# Patient Record
Sex: FEMALE | Race: WHITE | Employment: FULL TIME | URBAN - METROPOLITAN AREA
[De-identification: names, ages, dates, MRNs, and addresses within clinical notes are randomized per-mention and may not be internally consistent; named-entity substitution may affect disease eponyms.]

---

## 2022-05-05 ENCOUNTER — ANESTHESIA EVENT (OUTPATIENT)
Dept: ENDOSCOPY | Age: 37
End: 2022-05-05
Payer: OTHER GOVERNMENT

## 2022-05-05 RX ORDER — OXYCODONE AND ACETAMINOPHEN 7.5; 325 MG/1; MG/1
1 TABLET ORAL AS NEEDED
Status: CANCELLED | OUTPATIENT
Start: 2022-05-05

## 2022-05-05 RX ORDER — ALBUTEROL SULFATE 0.83 MG/ML
2.5 SOLUTION RESPIRATORY (INHALATION) AS NEEDED
Status: CANCELLED | OUTPATIENT
Start: 2022-05-05

## 2022-05-05 RX ORDER — FLUMAZENIL 0.1 MG/ML
0.2 INJECTION INTRAVENOUS
Status: CANCELLED | OUTPATIENT
Start: 2022-05-05

## 2022-05-05 RX ORDER — ONDANSETRON 2 MG/ML
4 INJECTION INTRAMUSCULAR; INTRAVENOUS ONCE
Status: CANCELLED | OUTPATIENT
Start: 2022-05-05 | End: 2022-05-05

## 2022-05-05 RX ORDER — DIPHENHYDRAMINE HYDROCHLORIDE 50 MG/ML
12.5 INJECTION, SOLUTION INTRAMUSCULAR; INTRAVENOUS
Status: CANCELLED | OUTPATIENT
Start: 2022-05-05

## 2022-05-05 RX ORDER — OXYCODONE AND ACETAMINOPHEN 10; 325 MG/1; MG/1
1 TABLET ORAL AS NEEDED
Status: CANCELLED | OUTPATIENT
Start: 2022-05-05

## 2022-05-05 RX ORDER — OXYCODONE AND ACETAMINOPHEN 5; 325 MG/1; MG/1
1 TABLET ORAL AS NEEDED
Status: CANCELLED | OUTPATIENT
Start: 2022-05-05

## 2022-05-05 RX ORDER — NALOXONE HYDROCHLORIDE 0.4 MG/ML
0.2 INJECTION, SOLUTION INTRAMUSCULAR; INTRAVENOUS; SUBCUTANEOUS AS NEEDED
Status: CANCELLED | OUTPATIENT
Start: 2022-05-05

## 2022-05-05 RX ORDER — SODIUM CHLORIDE, SODIUM LACTATE, POTASSIUM CHLORIDE, CALCIUM CHLORIDE 600; 310; 30; 20 MG/100ML; MG/100ML; MG/100ML; MG/100ML
1000 INJECTION, SOLUTION INTRAVENOUS CONTINUOUS
Status: CANCELLED | OUTPATIENT
Start: 2022-05-05

## 2022-05-06 ENCOUNTER — ANESTHESIA (OUTPATIENT)
Dept: ENDOSCOPY | Age: 37
End: 2022-05-06
Payer: OTHER GOVERNMENT

## 2022-05-06 ENCOUNTER — HOSPITAL ENCOUNTER (OUTPATIENT)
Age: 37
Setting detail: OUTPATIENT SURGERY
Discharge: HOME | End: 2022-05-06
Attending: INTERNAL MEDICINE | Admitting: INTERNAL MEDICINE
Payer: OTHER GOVERNMENT

## 2022-05-06 VITALS
RESPIRATION RATE: 16 BRPM | SYSTOLIC BLOOD PRESSURE: 107 MMHG | HEIGHT: 67 IN | BODY MASS INDEX: 27.99 KG/M2 | WEIGHT: 178.3 LBS | OXYGEN SATURATION: 100 % | TEMPERATURE: 97.6 F | DIASTOLIC BLOOD PRESSURE: 59 MMHG | HEART RATE: 64 BPM

## 2022-05-06 LAB — HCG SERPL QL: NEGATIVE

## 2022-05-06 PROCEDURE — 77030021593 HC FCPS BIOP ENDOSC BSC -A: Performed by: INTERNAL MEDICINE

## 2022-05-06 PROCEDURE — 74011000250 HC RX REV CODE- 250: Performed by: NURSE ANESTHETIST, CERTIFIED REGISTERED

## 2022-05-06 PROCEDURE — 2709999900 HC NON-CHARGEABLE SUPPLY: Performed by: INTERNAL MEDICINE

## 2022-05-06 PROCEDURE — 76060000032 HC ANESTHESIA 0.5 TO 1 HR: Performed by: INTERNAL MEDICINE

## 2022-05-06 PROCEDURE — 74011250636 HC RX REV CODE- 250/636: Performed by: INTERNAL MEDICINE

## 2022-05-06 PROCEDURE — 84703 CHORIONIC GONADOTROPIN ASSAY: CPT

## 2022-05-06 PROCEDURE — 76040000007: Performed by: INTERNAL MEDICINE

## 2022-05-06 PROCEDURE — 74011250636 HC RX REV CODE- 250/636: Performed by: NURSE ANESTHETIST, CERTIFIED REGISTERED

## 2022-05-06 RX ORDER — SODIUM CHLORIDE 0.9 % (FLUSH) 0.9 %
5-40 SYRINGE (ML) INJECTION AS NEEDED
Status: DISCONTINUED | OUTPATIENT
Start: 2022-05-06 | End: 2022-05-06 | Stop reason: HOSPADM

## 2022-05-06 RX ORDER — DEXTROMETHORPHAN/PSEUDOEPHED 2.5-7.5/.8
1.2 DROPS ORAL
Status: CANCELLED | OUTPATIENT
Start: 2022-05-06

## 2022-05-06 RX ORDER — FENTANYL CITRATE 50 UG/ML
INJECTION, SOLUTION INTRAMUSCULAR; INTRAVENOUS AS NEEDED
Status: DISCONTINUED | OUTPATIENT
Start: 2022-05-06 | End: 2022-05-06 | Stop reason: HOSPADM

## 2022-05-06 RX ORDER — SODIUM CHLORIDE 0.9 % (FLUSH) 0.9 %
5-40 SYRINGE (ML) INJECTION EVERY 8 HOURS
Status: DISCONTINUED | OUTPATIENT
Start: 2022-05-06 | End: 2022-05-06 | Stop reason: HOSPADM

## 2022-05-06 RX ORDER — ATROPINE SULFATE 0.1 MG/ML
0.5 INJECTION INTRAVENOUS
Status: CANCELLED | OUTPATIENT
Start: 2022-05-06 | End: 2022-05-07

## 2022-05-06 RX ORDER — PROPOFOL 10 MG/ML
INJECTION, EMULSION INTRAVENOUS AS NEEDED
Status: DISCONTINUED | OUTPATIENT
Start: 2022-05-06 | End: 2022-05-06 | Stop reason: HOSPADM

## 2022-05-06 RX ORDER — LIDOCAINE HYDROCHLORIDE 20 MG/ML
INJECTION, SOLUTION EPIDURAL; INFILTRATION; INTRACAUDAL; PERINEURAL AS NEEDED
Status: DISCONTINUED | OUTPATIENT
Start: 2022-05-06 | End: 2022-05-06 | Stop reason: HOSPADM

## 2022-05-06 RX ORDER — ONDANSETRON 2 MG/ML
INJECTION INTRAMUSCULAR; INTRAVENOUS AS NEEDED
Status: DISCONTINUED | OUTPATIENT
Start: 2022-05-06 | End: 2022-05-06 | Stop reason: HOSPADM

## 2022-05-06 RX ORDER — ERGOCALCIFEROL 1.25 MG/1
50000 CAPSULE ORAL
COMMUNITY

## 2022-05-06 RX ORDER — SODIUM CHLORIDE 9 MG/ML
125 INJECTION, SOLUTION INTRAVENOUS CONTINUOUS
Status: DISCONTINUED | OUTPATIENT
Start: 2022-05-06 | End: 2022-05-06 | Stop reason: HOSPADM

## 2022-05-06 RX ORDER — NALOXONE HYDROCHLORIDE 0.4 MG/ML
0.4 INJECTION, SOLUTION INTRAMUSCULAR; INTRAVENOUS; SUBCUTANEOUS
Status: CANCELLED | OUTPATIENT
Start: 2022-05-06 | End: 2022-05-06

## 2022-05-06 RX ORDER — EPINEPHRINE 0.1 MG/ML
1 INJECTION INTRACARDIAC; INTRAVENOUS
Status: CANCELLED | OUTPATIENT
Start: 2022-05-06 | End: 2022-05-07

## 2022-05-06 RX ORDER — FLUMAZENIL 0.1 MG/ML
0.2 INJECTION INTRAVENOUS
Status: CANCELLED | OUTPATIENT
Start: 2022-05-06 | End: 2022-05-06

## 2022-05-06 RX ADMIN — FENTANYL CITRATE 25 MCG: 50 INJECTION, SOLUTION INTRAMUSCULAR; INTRAVENOUS at 13:35

## 2022-05-06 RX ADMIN — ONDANSETRON HYDROCHLORIDE 4 MG: 2 INJECTION INTRAMUSCULAR; INTRAVENOUS at 13:34

## 2022-05-06 RX ADMIN — SODIUM CHLORIDE: 900 INJECTION, SOLUTION INTRAVENOUS at 13:32

## 2022-05-06 RX ADMIN — PROPOFOL 20 MG: 10 INJECTION, EMULSION INTRAVENOUS at 13:36

## 2022-05-06 RX ADMIN — PROPOFOL 20 MG: 10 INJECTION, EMULSION INTRAVENOUS at 13:44

## 2022-05-06 RX ADMIN — LIDOCAINE HYDROCHLORIDE 40 MG: 20 INJECTION, SOLUTION INTRAVENOUS at 13:35

## 2022-05-06 RX ADMIN — PROPOFOL 20 MG: 10 INJECTION, EMULSION INTRAVENOUS at 13:41

## 2022-05-06 RX ADMIN — PROPOFOL 30 MG: 10 INJECTION, EMULSION INTRAVENOUS at 13:40

## 2022-05-06 RX ADMIN — PROPOFOL 30 MG: 10 INJECTION, EMULSION INTRAVENOUS at 13:37

## 2022-05-06 RX ADMIN — SODIUM CHLORIDE 125 ML/HR: 900 INJECTION, SOLUTION INTRAVENOUS at 12:10

## 2022-05-06 RX ADMIN — PROPOFOL 20 MG: 10 INJECTION, EMULSION INTRAVENOUS at 13:46

## 2022-05-06 RX ADMIN — PROPOFOL 100 MG: 10 INJECTION, EMULSION INTRAVENOUS at 13:35

## 2022-05-06 NOTE — PROCEDURES
Colonoscopy Procedure Note    Indications: Family history of colon cancer    Anesthesia/Sedation: MAC anesthesia Propofol    Pre-Procedure Exam:  Airway: clear   Heart: normal S1and S2    Lungs: clear bilateral  Abdomen: soft, nontender, bowel sounds present and normal in all quadrants   Mental Status: awake, alert, and oriented to person, place, and time      Procedure in Detail:  Informed consent was obtained for the procedure, including sedation. Risks of perforation, hemorrhage, adverse drug reaction, and aspiration were discussed. The patient was placed in the left lateral decubitus position. Based on the pre-procedure assessment, including review of the patient's medical history, medications, allergies, and review of systems, she had been deemed to be an appropriate candidate for moderate sedation; she was therefore sedated with the medications listed above. The patient was monitored continuously with ECG tracing, pulse oximetry, blood pressure monitoring, and direct observations. A rectal examination was performed. The [id number] was inserted into the rectum and advanced under direct vision to the cecum, which was identified by the ileocecal valve and appendiceal orifice. The quality of the colonic preparation was excellent. A careful inspection was made as the colonoscope was withdrawn, including a retroflexed view of the rectum; findings and interventions are described below. Appropriate photodocumentation was obtained. ANUS: Anal exam reveals no masses or hemorrhoids, sphincter tone is normal.   RECTUM: Rectal exam reveals no masses or hemorrhoids. SIGMOID COLON: The mucosa is normal with good vascular pattern and without ulcers, diverticula, and polyps. DESCENDING COLON: The mucosa is normal with good vascular pattern and without ulcers, diverticula, and polyps.    SPLENIC FLEXURE: The splenic flexure is normal.   TRANSVERSE COLON: The mucosa is normal with good vascular pattern and without ulcers, diverticula, and polyps. HEPATIC FLEXURE: The hepatic flexure is normal.   ASCENDING COLON: The mucosa is normal with good vascular pattern and without ulcers, diverticula, and polyps. CECUM: The appendiceal orifice appears normal. The ileocecal valve appears normal.   TERMINAL ILEUM: The terminal ileum was not entered. Specimens: No specimens were collected. EBL: None    No prosthetic devices, grafts, tissues, transplant or devices implanted. Withdrawal Time: 13 min    Complications: None; patient tolerated the procedure well. Attending Attestation: I performed the procedure. Recommendations:   - Repeat colonoscopy in 5 years.     Signed By: Florentin Perdue MD                      5/6/2022

## 2022-05-06 NOTE — ANESTHESIA POSTPROCEDURE EVALUATION
Post-Anesthesia Evaluation/Assessment    Cardiovascular Function/Vital Signs  Visit Vitals  BP (!) 97/57   Pulse (!) 57   Temp 36.4 °C (97.6 °F)   Resp 15   Ht 5' 7\" (1.702 m)   Wt 80.9 kg (178 lb 4.8 oz)   SpO2 100%   Breastfeeding No   BMI 27.93 kg/m²       Patient is status post Procedure(s):  COLONOSCOPY WITH MAC. Nausea/Vomiting: Controlled. Postoperative hydration reviewed and adequate. Pain:  Pain Scale 1: Numeric (0 - 10) (05/06/22 1407)  Pain Intensity 1: 0 (05/06/22 1407)   Managed. Neurological Status/Mental Status and Level of Consciousness:   Appropriately returning/progressing to baseline     Pulmonary Status:   O2 Device: None (Room air) (05/06/22 1407)   Adequate oxygenation and airway patent. No apparent anesthetic complications.      Signed By: Shireen Bliss MD    May 6, 2022

## 2022-05-06 NOTE — H&P
[address] [address]  Tel: [phone]  Fax: [phone]    Patient:  Nelson Hanks  YOB: 1985  Birth Sex:  Female  Current Gender:  Female  Date:   03/18/2022 2:40 PM   Historian:    self  Visit Type:   Office Visit        Completed Orders (This Visit)  Order  Details  Reason  Side  Interpretation  Result  Initial Treatment Date  Region  Weight monitoring                Dietary management education, guidance, and counseling                  Assessment/Plan  #  Detail Type  Description   1. Assessment  Family history of malignant neoplasm of digestive organs (Z80.0). Patient Plan  This is a pleasant 40 y/o female with a brother diagnosed recently with Stage III colon cancer at the age of 45. Also has an uncle with colon cancer    PLAN  1.  obtain brother's path report and any genetic testing for review  2. COLO to eval.  every 5 year plan     The risks, benefits, adverse reactions were discussed in detail today with the patient. This includes, but is not limited to, the risk of bleeding, infections, perforation, death, missed lesions, reactions to anesthesia/sedation, other. They understand and agree to undergo the procedure. Colonoscopy with MAC, miralax solution. 2.  Assessment  Body mass index (BMI) 27.0-27.9, adult (S82.07). Plan Orders  Today's instructions / counseling include(s) Dietary management education, guidance, and counseling. Weight monitoring              This 39year old  patient was referred by Rose Elias. This 39year old female presents for Colon Cancer Screening. History of Present Illness  1. Colon Cancer Screening   No prior screening. Risk Factors: family history - sibling.   Pertinent negatives include abdominal pain, change in bowel habits, change in stool caliber, constipation, decreased appetite, diarrhea, melena, nausea, rectal bleeding, vomiting, weight gain and weight loss. Additional information: No family history of colon cancer, No family history of Crohn's/colitis and No NSAID/ASA use. Problem List  Problem List reviewed. Medications (active prior to today)  Medication  Instructions  Start Date  Stop Date  Refilled  Elsewhere  multivitamin    //  03/18/2022    Y  Motrin IB    // 03/18/2022    Y  Vitamin D2 1,250 mcg (50,000 unit) capsule  take 1 capsule by oral route  every week  02/25/2022 05/25/2022    N        Medications (Added, Continued or Stopped today)  Start Date  Medication  Directions  PRN Status  PRN Reason  Instruction  Stop Date    Motrin IB    N      03/18/2022    multivitamin    N      03/18/2022 02/25/2022  Vitamin D2 1,250 mcg (50,000 unit) capsule  take 1 capsule by oral route  every week  N      05/25/2022    Allergies  Ingredient  Reaction (Severity)  Medication Name  Comment  CHLORHEXIDINE  Rash              Review of Systems  System  Neg/Pos  Details  Constitutional  Negative  Chills, Fever, Weight gain and Weight loss. ENMT  Negative  Ear infections and Nasal congestion. Respiratory  Negative  Chronic cough, Dyspnea and Wheezing. Cardio  Negative  Chest pain. GI  Negative  Abdominal pain, Change in bowel habits, Change in stool caliber, Constipation, Decreased appetite, Diarrhea, Dysphagia, Heartburn, Hematemesis, Hematochezia, Melena, Nausea, Rectal bleeding, Reflux and Vomiting.   Negative  Dysuria. Endocrine  Negative  Cold intolerance and Heat intolerance. Neuro  Negative  Dizziness and Headache. Psych  Negative  Anxiety, Depression and Increased stress. MS  Negative  Back pain and Joint pain. Hema/Lymph  Negative  Easy bleeding and Easy bruising.       Vital Signs  Height  Time  ft  in  cm  Last Measured  Height Position  2:51 PM  5.0  7.00  170.18  09/24/2019  0    Weight/BSA/BMI  Time  lb  oz  kg  Context  BMI kg/m2  BSA m2  2:51 PM  176.00    79.832    27.57      Blood Pressure  Time  BP mm/Hg  Position  Side  Site  Method  Cuff Size  2:51 PM  122/80              Temperature/Pulse/Respiration  Time  Temp F  Temp C  Temp Site  Pulse/min  Pattern  Resp/ min  2:51 PM        72  regular      Pulse Oximetry/FIO2  Time  Pulse Ox (Rest %)  Pulse Ox (Amb %)  O2 Sat  O2 L/Min  Timing  FiO2 %  L/min  Delivery Method  Finger Probe  2:51 PM  99    RA                Measured by  Time  Measured by  2:51 PM  Ginger Foley    Physical  Exam  Exam  Findings  Details  Constitutional  Normal  No acute distress. Well nourished. Well developed. Eyes  Normal  General - Right: Normal, Left: Normal. Sclera - Right: Normal, Left: Normal. Pupil - Right: Normal, Left: Normal. Iris - Right: Normal, Left: Normal.  Ears  Normal  Inspection - Right: Normal, Left: Normal.  Nose/Mouth/Throat  Normal  External nose - Normal. Lips/teeth/gums - Normal.  Neck Exam  Normal  Inspection - Normal. Thyroid gland - Normal. Range of motion - Normal.  Respiratory  Normal  Inspection - Normal. Auscultation - Normal. Effort - Normal.  Cardiovascular  Normal  Heart rate - Regular rate. Rhythm - Regular. Murmurs - None. Abdomen  Normal  Inspection - Normal. Appliance(s) - None. Auscultation - Normal. Anterior palpation - Normal, No guarding, No rebound. No abdominal tenderness. No hepatic enlargement. No hernia. No ascites. Barron's sign - Normal.  Skin  *  Body areas examined - Head/face, Neck, Abdomen. Skin  Normal  Inspection - Normal. Nails - Normal.  Musculoskeletal  Normal  Visual overview of all four extremities is normal. Gait - Normal.  Extremity  Normal  No Edema. Clubbing - Absent. Neurological  Normal  Level of consciousness - Normal. Memory - Normal. Cranial nerves - Cranial nerves II through XII grossly intact. Sensory - Normal. Balance & gait - Normal. Coordination - Normal.  Psychiatric  Normal  Orientation - Oriented to time, place, person & situation. No agitation. Appropriate mood and affect.  Behavior is appropriate for age.  Immunizations Entered by History  Date  Immunization  9/9/2019 12:00:00 AM  influenza, intradermal, quadrivalent, preservative free, injectable      Active Patient Care Team Members  Name  Contact  Agency Type  Support Role  Relationship  Active Date  Inactive Date  Specialty  Lillian Cates      encounter provider        Gastroenterology  Simeon Reed      Patient provider  PCP      [address] [facility] [place]      Emergency Contact  Spouse            Provider:    Sabrina Ansari MD 03/18/2022 3:16 PM    Document generated by: Sabrina Ansari 03/18/2022    CC Providers    Simeon Reed   [address] [facility], [facility] [address]    ----------------------------------------------------------------------------------------------------------------------------------------------------------------------      Electronically signed by Sabrina Ansari MD on 03/18/2022 03:54 PM

## (undated) DEVICE — SYR 50ML SLIP TIP NSAF LF STRL --

## (undated) DEVICE — TRAP SPEC COLL POLYP POLYSTYR --

## (undated) DEVICE — SPONGE GZ W4XL4IN RAYON POLY 4 PLY NONWOVEN FASTER WICKING

## (undated) DEVICE — SINGLE PORT MANIFOLD: Brand: NEPTUNE 2

## (undated) DEVICE — 4-PORT MANIFOLD: Brand: NEPTUNE 2

## (undated) DEVICE — SET ADMIN 16ML TBNG L100IN 2 Y INJ SITE IV PIGGY BK DISP

## (undated) DEVICE — CATH IV SAFE STR 22GX1IN BLU -- PROTECTIV PLUS

## (undated) DEVICE — FORCEPS BX CAP 240CM L RAD JAW 4

## (undated) DEVICE — Device

## (undated) DEVICE — SYR 3ML LL TIP 1/10ML GRAD --

## (undated) DEVICE — MOUTHPIECE ENDOSCP 20X27MM --

## (undated) DEVICE — EJECTOR SALIVA 6 IN FLX CLR

## (undated) DEVICE — BRUSH CYTO L240CM DIA2.3MM GI COLONOSCOPY 3 RNG HNDL RADPQ

## (undated) DEVICE — AIRLIFE™ NASAL OXYGEN CANNULA CURVED, FLARED TIP WITH 14 FOOT (4.3 M) CRUSH-RESISTANT TUBING, OVER-THE-EAR STYLE: Brand: AIRLIFE™

## (undated) DEVICE — KENDALL RADIOLUCENT FOAM MONITORING ELECTRODE RECTANGULAR SHAPE: Brand: KENDALL

## (undated) DEVICE — ENDO CARRY-ON PROCEDURE KIT INCLUDES ENZYMATIC SPONGE, GAUZE, BIOHAZARD LABEL, TRAY, LUBRICANT, DIRTY SCOPE LABEL, WATER LABEL, TRAY, DRAWSTRING PAD, AND DEFENDO 4-PIECE KIT.: Brand: ENDO CARRY-ON PROCEDURE KIT

## (undated) DEVICE — MAJ-1414 SINGLE USE ADPATER BIOPSY VALV: Brand: SINGLE USE ADAPTOR BIOPSY VALVE

## (undated) DEVICE — TRNQT TEXT 1X18IN BLU LF DISP -- CONVERT TO ITEM 362165

## (undated) DEVICE — NDL PRT INJ NSAF BLNT 18GX1.5 --